# Patient Record
Sex: FEMALE | Race: WHITE
[De-identification: names, ages, dates, MRNs, and addresses within clinical notes are randomized per-mention and may not be internally consistent; named-entity substitution may affect disease eponyms.]

---

## 2017-07-05 ENCOUNTER — HOSPITAL ENCOUNTER (OUTPATIENT)
Dept: HOSPITAL 83 - US | Age: 42
Discharge: HOME | End: 2017-07-05
Attending: NURSE PRACTITIONER
Payer: COMMERCIAL

## 2017-07-05 DIAGNOSIS — M79.661: Primary | ICD-10-CM

## 2017-07-05 DIAGNOSIS — M79.662: ICD-10-CM

## 2017-11-13 ENCOUNTER — HOSPITAL ENCOUNTER (EMERGENCY)
Dept: HOSPITAL 83 - ED | Age: 42
Discharge: HOME | End: 2017-11-13
Payer: COMMERCIAL

## 2017-11-13 VITALS — DIASTOLIC BLOOD PRESSURE: 80 MMHG

## 2017-11-13 VITALS — HEIGHT: 63.98 IN | WEIGHT: 154 LBS | BODY MASS INDEX: 26.29 KG/M2

## 2017-11-13 DIAGNOSIS — Z90.89: ICD-10-CM

## 2017-11-13 DIAGNOSIS — Z79.899: ICD-10-CM

## 2017-11-13 DIAGNOSIS — N39.0: Primary | ICD-10-CM

## 2017-11-13 DIAGNOSIS — Z98.890: ICD-10-CM

## 2017-11-13 LAB
ALBUMIN SERPL-MCNC: 3.5 GM/DL (ref 3.1–4.5)
ALP SERPL-CCNC: 85 U/L (ref 45–117)
ALT SERPL W P-5'-P-CCNC: 21 U/L (ref 12–78)
APPEARANCE UR: (no result)
AST SERPL-CCNC: 21 IU/L (ref 3–35)
BACTERIA #/AREA URNS HPF: (no result) /[HPF]
BASOPHILS # BLD AUTO: 0 10*3/UL (ref 0–0.1)
BASOPHILS NFR BLD AUTO: 0.1 % (ref 0–1)
BILIRUB UR QL STRIP: NEGATIVE
BUN SERPL-MCNC: 15 MG/DL (ref 7–24)
CHLORIDE SERPL-SCNC: 106 MMOL/L (ref 98–107)
COLOR UR: YELLOW
CREAT SERPL-MCNC: 1.09 MG/DL (ref 0.55–1.02)
EOSINOPHIL # BLD AUTO: 0.2 10*3/UL (ref 0–0.4)
EOSINOPHIL # BLD AUTO: 2.7 % (ref 1–4)
EPI CELLS #/AREA URNS HPF: (no result) /[HPF]
ERYTHROCYTE [DISTWIDTH] IN BLOOD BY AUTOMATED COUNT: 13.2 % (ref 0–14.5)
GLUCOSE UR QL: NEGATIVE
HCT VFR BLD AUTO: 39.5 % (ref 37–47)
HGB BLD-MCNC: 13 G/DL (ref 12–16)
HGB UR QL STRIP: NEGATIVE
KETONES UR QL STRIP: NEGATIVE
LEUKOCYTE ESTERASE UR QL STRIP: NEGATIVE
LYMPHOCYTES # BLD AUTO: 2 10*3/UL (ref 1.3–4.4)
LYMPHOCYTES NFR BLD AUTO: 26.3 % (ref 27–41)
MCH RBC QN AUTO: 29.6 PG (ref 27–31)
MCHC RBC AUTO-ENTMCNC: 32.9 G/DL (ref 33–37)
MCV RBC AUTO: 90 FL (ref 81–99)
MONOCYTES # BLD AUTO: 0.6 10*3/UL (ref 0.1–1)
MONOCYTES NFR BLD MANUAL: 7.7 % (ref 3–9)
NEUT #: 4.7 10*3/UL (ref 2.3–7.9)
NEUT %: 62.8 % (ref 47–73)
NITRITE UR QL STRIP: NEGATIVE
NRBC BLD QL AUTO: 0 % (ref 0–0)
PH UR STRIP: 7 [PH] (ref 5–9)
PLATELET # BLD AUTO: 359 10*3/UL (ref 130–400)
PMV BLD AUTO: 10.7 FL (ref 9.6–12.3)
POTASSIUM SERPL-SCNC: 4.7 MMOL/L (ref 3.5–5.1)
PROT SERPL-MCNC: 7.1 GM/DL (ref 6.4–8.2)
RBC # BLD AUTO: 4.39 10*6/UL (ref 4.1–5.1)
RBC #/AREA URNS HPF: (no result) RBC/HPF (ref 0–2)
SODIUM SERPL-SCNC: 141 MMOL/L (ref 136–145)
SP GR UR: 1.01 (ref 1–1.03)
UROBILINOGEN UR STRIP-MCNC: 0.2 E.U./DL (ref 0.2–1)
WBC #/AREA URNS HPF: (no result) WBC/HPF (ref 0–5)
WBC NRBC COR # BLD AUTO: 7.5 10*3/UL (ref 4.8–10.8)

## 2018-10-03 ENCOUNTER — HOSPITAL ENCOUNTER (OUTPATIENT)
Dept: HOSPITAL 83 - MAMMO | Age: 43
Discharge: HOME | End: 2018-10-03
Attending: NURSE PRACTITIONER
Payer: COMMERCIAL

## 2018-10-03 DIAGNOSIS — R92.8: Primary | ICD-10-CM

## 2018-10-03 DIAGNOSIS — Z98.82: ICD-10-CM

## 2018-12-30 ENCOUNTER — HOSPITAL ENCOUNTER (EMERGENCY)
Age: 43
LOS: 1 days | Discharge: HOME | End: 2018-12-31
Payer: COMMERCIAL

## 2018-12-30 DIAGNOSIS — G43.909: ICD-10-CM

## 2018-12-30 DIAGNOSIS — Z79.899: ICD-10-CM

## 2018-12-30 DIAGNOSIS — K52.9: Primary | ICD-10-CM

## 2018-12-30 LAB
ALBUMIN SERPL-MCNC: 3.6 GM/DL (ref 3.1–4.5)
ALP SERPL-CCNC: 84 U/L (ref 45–117)
ALT SERPL W P-5'-P-CCNC: 19 U/L (ref 12–78)
AST SERPL-CCNC: 14 IU/L (ref 3–35)
BUN SERPL-MCNC: 16 MG/DL (ref 7–24)
CHLORIDE SERPL-SCNC: 105 MMOL/L (ref 98–107)
CREAT SERPL-MCNC: 0.85 MG/DL (ref 0.55–1.02)
ERYTHROCYTE [DISTWIDTH] IN BLOOD BY AUTOMATED COUNT: 13 % (ref 0–14.5)
HCT VFR BLD AUTO: 43.5 % (ref 37–47)
HGB BLD-MCNC: 14.3 G/DL (ref 12–16)
LIPASE SERPL-CCNC: 91 U/L (ref 73–393)
MCH RBC QN AUTO: 29.8 PG (ref 27–31)
MCHC RBC AUTO-ENTMCNC: 32.9 G/DL (ref 33–37)
MCV RBC AUTO: 90.6 FL (ref 81–99)
NRBC BLD QL AUTO: 0 % (ref 0–0)
PLATELET # BLD AUTO: 280 10*3/UL (ref 130–400)
PLATELET SUFFICIENCY: NORMAL
PMV BLD AUTO: 11.3 FL (ref 9.6–12.3)
POTASSIUM SERPL-SCNC: 3.9 MMOL/L (ref 3.5–5.1)
PROT SERPL-MCNC: 7.3 GM/DL (ref 6.4–8.2)
RBC # BLD AUTO: 4.8 10*6/UL (ref 4.1–5.1)
RBC MORPH BLD: NORMAL
SODIUM SERPL-SCNC: 137 MMOL/L (ref 136–145)
TOTAL CELLS COUNTED: 100 #CELLS
WBC NRBC COR # BLD AUTO: 12 10*3/UL (ref 4.8–10.8)

## 2018-12-31 LAB
APPEARANCE UR: CLEAR
BACTERIA #/AREA URNS HPF: (no result) /[HPF]
BILIRUB UR QL STRIP: NEGATIVE
COLOR UR: YELLOW
EPI CELLS #/AREA URNS HPF: (no result) /[HPF]
GLUCOSE UR QL: NEGATIVE
HGB UR QL STRIP: NEGATIVE
KETONES UR QL STRIP: (no result)
LEUKOCYTE ESTERASE UR QL STRIP: NEGATIVE
NITRITE UR QL STRIP: NEGATIVE
PH UR STRIP: 6 [PH] (ref 5–9)
RBC #/AREA URNS HPF: (no result) RBC/HPF (ref 0–2)
SP GR UR: >= 1.03 (ref 1–1.03)
UROBILINOGEN UR STRIP-MCNC: 0.2 E.U./DL (ref 0.2–1)

## 2019-11-05 ENCOUNTER — HOSPITAL ENCOUNTER (OUTPATIENT)
Dept: HOSPITAL 83 - CARD | Age: 44
Discharge: HOME | End: 2019-11-05
Attending: NURSE PRACTITIONER
Payer: COMMERCIAL

## 2019-11-05 DIAGNOSIS — R25.1: ICD-10-CM

## 2019-11-05 DIAGNOSIS — F41.9: Primary | ICD-10-CM

## 2019-11-05 DIAGNOSIS — R42: ICD-10-CM

## 2019-11-05 DIAGNOSIS — F32.9: ICD-10-CM

## 2019-11-05 DIAGNOSIS — L65.9: ICD-10-CM

## 2019-12-09 ENCOUNTER — HOSPITAL ENCOUNTER (OUTPATIENT)
Dept: HOSPITAL 83 - MRI | Age: 44
Discharge: HOME | End: 2019-12-09
Attending: NURSE PRACTITIONER
Payer: COMMERCIAL

## 2019-12-09 DIAGNOSIS — G43.909: Primary | ICD-10-CM

## 2019-12-09 DIAGNOSIS — H57.12: ICD-10-CM

## 2020-03-01 ENCOUNTER — HOSPITAL ENCOUNTER (EMERGENCY)
Dept: HOSPITAL 83 - ED | Age: 45
Discharge: HOME | End: 2020-03-01
Payer: COMMERCIAL

## 2020-03-01 VITALS — HEIGHT: 63.98 IN | WEIGHT: 150 LBS | BODY MASS INDEX: 25.61 KG/M2

## 2020-03-01 VITALS — SYSTOLIC BLOOD PRESSURE: 128 MMHG | DIASTOLIC BLOOD PRESSURE: 77 MMHG

## 2020-03-01 DIAGNOSIS — F32.9: ICD-10-CM

## 2020-03-01 DIAGNOSIS — Z79.899: ICD-10-CM

## 2020-03-01 DIAGNOSIS — F41.9: ICD-10-CM

## 2020-03-01 DIAGNOSIS — Z79.2: ICD-10-CM

## 2020-03-01 DIAGNOSIS — J11.1: Primary | ICD-10-CM

## 2020-05-04 ENCOUNTER — HOSPITAL ENCOUNTER (EMERGENCY)
Dept: HOSPITAL 83 - ED | Age: 45
Discharge: HOME | End: 2020-05-04
Payer: COMMERCIAL

## 2020-05-04 VITALS — BODY MASS INDEX: 26.63 KG/M2 | HEIGHT: 63.98 IN | WEIGHT: 156 LBS

## 2020-05-04 VITALS — DIASTOLIC BLOOD PRESSURE: 60 MMHG

## 2020-05-04 DIAGNOSIS — Z90.89: ICD-10-CM

## 2020-05-04 DIAGNOSIS — Z98.890: ICD-10-CM

## 2020-05-04 DIAGNOSIS — Z79.899: ICD-10-CM

## 2020-05-04 DIAGNOSIS — L25.9: Primary | ICD-10-CM

## 2020-05-16 ENCOUNTER — HOSPITAL ENCOUNTER (EMERGENCY)
Dept: HOSPITAL 83 - ED | Age: 45
LOS: 1 days | Discharge: HOME | End: 2020-05-17
Payer: COMMERCIAL

## 2020-05-16 VITALS — BODY MASS INDEX: 25.66 KG/M2 | WEIGHT: 154 LBS | HEIGHT: 65 IN

## 2020-05-16 DIAGNOSIS — R07.89: Primary | ICD-10-CM

## 2020-05-16 DIAGNOSIS — K21.9: ICD-10-CM

## 2020-05-16 DIAGNOSIS — F41.9: ICD-10-CM

## 2020-05-16 DIAGNOSIS — E78.5: ICD-10-CM

## 2020-05-16 DIAGNOSIS — Z79.899: ICD-10-CM

## 2020-05-16 LAB
ALBUMIN SERPL-MCNC: 3.4 GM/DL (ref 3.1–4.5)
ALP SERPL-CCNC: 65 U/L (ref 45–117)
ALT SERPL W P-5'-P-CCNC: 15 U/L (ref 12–78)
APTT PPP: 20.2 SECONDS (ref 20–32.1)
AST SERPL-CCNC: 13 IU/L (ref 3–35)
BASOPHILS # BLD AUTO: 0 10*3/UL (ref 0–0.1)
BASOPHILS NFR BLD AUTO: 0.1 % (ref 0–1)
BUN SERPL-MCNC: 14 MG/DL (ref 7–24)
CHLORIDE SERPL-SCNC: 105 MMOL/L (ref 98–107)
CREAT SERPL-MCNC: 1.04 MG/DL (ref 0.55–1.02)
EOSINOPHIL # BLD AUTO: 0 10*3/UL (ref 0–0.4)
EOSINOPHIL # BLD AUTO: 0.1 % (ref 1–4)
ERYTHROCYTE [DISTWIDTH] IN BLOOD BY AUTOMATED COUNT: 14.2 % (ref 0–14.5)
HCT VFR BLD AUTO: 39.2 % (ref 37–47)
INR BLD: 0.9 (ref 2–3.5)
LYMPHOCYTES # BLD AUTO: 3.2 10*3/UL (ref 1.3–4.4)
LYMPHOCYTES NFR BLD AUTO: 13.6 % (ref 27–41)
MCH RBC QN AUTO: 29.5 PG (ref 27–31)
MCHC RBC AUTO-ENTMCNC: 32.9 G/DL (ref 33–37)
MCV RBC AUTO: 89.7 FL (ref 81–99)
MONOCYTES # BLD AUTO: 1.2 10*3/UL (ref 0.1–1)
MONOCYTES NFR BLD MANUAL: 5.1 % (ref 3–9)
NEUT #: 18.6 10*3/UL (ref 2.3–7.9)
NEUT %: 80.3 % (ref 47–73)
NRBC BLD QL AUTO: 0 10*3/UL (ref 0–0)
PLATELET # BLD AUTO: 334 10*3/UL (ref 130–400)
PMV BLD AUTO: 10.6 FL (ref 9.6–12.3)
POTASSIUM SERPL-SCNC: 3.9 MMOL/L (ref 3.5–5.1)
PROT SERPL-MCNC: 6.7 GM/DL (ref 6.4–8.2)
RBC # BLD AUTO: 4.37 10*6/UL (ref 4.1–5.1)
SODIUM SERPL-SCNC: 139 MMOL/L (ref 136–145)
WBC NRBC COR # BLD AUTO: 23.2 10*3/UL (ref 4.8–10.8)

## 2020-05-17 VITALS — DIASTOLIC BLOOD PRESSURE: 78 MMHG | SYSTOLIC BLOOD PRESSURE: 112 MMHG

## 2020-05-17 LAB
APPEARANCE UR: CLEAR
BILIRUB UR QL STRIP: NEGATIVE
COLOR UR: YELLOW
GLUCOSE UR QL: NEGATIVE
HGB UR QL STRIP: NEGATIVE
KETONES UR QL STRIP: NEGATIVE
LEUKOCYTE ESTERASE UR QL STRIP: NEGATIVE
NITRITE UR QL STRIP: NEGATIVE
PH UR STRIP: 6.5 [PH] (ref 5–9)
RBC #/AREA URNS HPF: (no result) RBC/HPF (ref 0–2)
SP GR UR: 1.01 (ref 1–1.03)
TROPONIN I SERPL-MCNC: < 0.015 NG/ML (ref ?–0.04)
UROBILINOGEN UR STRIP-MCNC: 0.2 E.U./DL (ref 0.2–1)
WBC #/AREA URNS HPF: (no result) WBC/HPF (ref 0–5)

## 2020-05-28 ENCOUNTER — HOSPITAL ENCOUNTER (OUTPATIENT)
Dept: HOSPITAL 83 - US | Age: 45
Discharge: HOME | End: 2020-05-28
Attending: EMERGENCY MEDICINE
Payer: COMMERCIAL

## 2020-05-28 DIAGNOSIS — R10.9: Primary | ICD-10-CM

## 2020-07-31 ENCOUNTER — HOSPITAL ENCOUNTER (OUTPATIENT)
Dept: HOSPITAL 83 - US | Age: 45
Discharge: HOME | End: 2020-07-31
Attending: NURSE PRACTITIONER
Payer: COMMERCIAL

## 2020-07-31 DIAGNOSIS — R22.1: Primary | ICD-10-CM

## 2020-10-06 ENCOUNTER — HOSPITAL ENCOUNTER (EMERGENCY)
Dept: HOSPITAL 83 - ED | Age: 45
LOS: 1 days | Discharge: HOME | End: 2020-10-07
Payer: COMMERCIAL

## 2020-10-06 VITALS — BODY MASS INDEX: 26.66 KG/M2 | WEIGHT: 160 LBS | HEIGHT: 65 IN

## 2020-10-06 DIAGNOSIS — J06.9: Primary | ICD-10-CM

## 2020-10-06 DIAGNOSIS — Z79.899: ICD-10-CM

## 2021-02-03 ENCOUNTER — HOSPITAL ENCOUNTER (OUTPATIENT)
Dept: HOSPITAL 83 - MAMMO | Age: 46
Discharge: HOME | End: 2021-02-03
Attending: STUDENT IN AN ORGANIZED HEALTH CARE EDUCATION/TRAINING PROGRAM
Payer: COMMERCIAL

## 2021-02-03 DIAGNOSIS — R92.1: ICD-10-CM

## 2021-02-03 DIAGNOSIS — R92.8: ICD-10-CM

## 2021-02-03 DIAGNOSIS — M85.88: ICD-10-CM

## 2021-02-03 DIAGNOSIS — M48.02: ICD-10-CM

## 2021-02-03 DIAGNOSIS — M50.31: Primary | ICD-10-CM

## 2021-04-24 ENCOUNTER — HOSPITAL ENCOUNTER (EMERGENCY)
Dept: HOSPITAL 83 - ED | Age: 46
LOS: 1 days | Discharge: HOME | End: 2021-04-25
Payer: COMMERCIAL

## 2021-04-24 VITALS — BODY MASS INDEX: 28.34 KG/M2 | HEIGHT: 63.98 IN | WEIGHT: 166 LBS

## 2021-04-24 DIAGNOSIS — K21.9: ICD-10-CM

## 2021-04-24 DIAGNOSIS — F41.9: ICD-10-CM

## 2021-04-24 DIAGNOSIS — Z98.890: ICD-10-CM

## 2021-04-24 DIAGNOSIS — R07.89: Primary | ICD-10-CM

## 2021-04-24 DIAGNOSIS — Z79.899: ICD-10-CM

## 2021-04-24 DIAGNOSIS — E78.5: ICD-10-CM

## 2021-04-25 VITALS — DIASTOLIC BLOOD PRESSURE: 96 MMHG | SYSTOLIC BLOOD PRESSURE: 140 MMHG

## 2021-04-25 LAB
ALBUMIN SERPL-MCNC: 3.3 GM/DL (ref 3.1–4.5)
ALP SERPL-CCNC: 116 U/L (ref 45–117)
ALT SERPL W P-5'-P-CCNC: 14 U/L (ref 12–78)
AST SERPL-CCNC: 12 IU/L (ref 3–35)
BASOPHILS # BLD AUTO: 0 10*3/UL (ref 0–0.1)
BASOPHILS NFR BLD AUTO: 0.3 % (ref 0–1)
BUN SERPL-MCNC: 16 MG/DL (ref 7–24)
CHLORIDE SERPL-SCNC: 108 MMOL/L (ref 98–107)
CREAT SERPL-MCNC: 0.97 MG/DL (ref 0.55–1.02)
EOSINOPHIL # BLD AUTO: 0.1 10*3/UL (ref 0–0.4)
EOSINOPHIL # BLD AUTO: 0.5 % (ref 1–4)
ERYTHROCYTE [DISTWIDTH] IN BLOOD BY AUTOMATED COUNT: 13.5 % (ref 0–14.5)
HCT VFR BLD AUTO: 38.2 % (ref 37–47)
LYMPHOCYTES # BLD AUTO: 1.8 10*3/UL (ref 1.3–4.4)
LYMPHOCYTES NFR BLD AUTO: 15.6 % (ref 27–41)
MCH RBC QN AUTO: 28.4 PG (ref 27–31)
MCHC RBC AUTO-ENTMCNC: 32.2 G/DL (ref 33–37)
MCV RBC AUTO: 88.2 FL (ref 81–99)
MONOCYTES # BLD AUTO: 0.9 10*3/UL (ref 0.1–1)
MONOCYTES NFR BLD MANUAL: 7.5 % (ref 3–9)
NEUT #: 8.8 10*3/UL (ref 2.3–7.9)
NEUT %: 75.8 % (ref 47–73)
NRBC BLD QL AUTO: 0 10*3/UL (ref 0–0)
PLATELET # BLD AUTO: 327 10*3/UL (ref 130–400)
PMV BLD AUTO: 11.4 FL (ref 9.6–12.3)
POTASSIUM SERPL-SCNC: 3.3 MMOL/L (ref 3.5–5.1)
PROT SERPL-MCNC: 6.8 GM/DL (ref 6.4–8.2)
RBC # BLD AUTO: 4.33 10*6/UL (ref 4.1–5.1)
SODIUM SERPL-SCNC: 141 MMOL/L (ref 136–145)
WBC NRBC COR # BLD AUTO: 11.6 10*3/UL (ref 4.8–10.8)

## 2021-07-17 ENCOUNTER — HOSPITAL ENCOUNTER (EMERGENCY)
Dept: HOSPITAL 83 - ED | Age: 46
Discharge: HOME | End: 2021-07-17
Payer: COMMERCIAL

## 2021-07-17 VITALS — DIASTOLIC BLOOD PRESSURE: 73 MMHG | SYSTOLIC BLOOD PRESSURE: 110 MMHG

## 2021-07-17 VITALS — BODY MASS INDEX: 29.02 KG/M2 | HEIGHT: 63.98 IN | WEIGHT: 170 LBS

## 2021-07-17 DIAGNOSIS — Z87.442: ICD-10-CM

## 2021-07-17 DIAGNOSIS — M94.0: ICD-10-CM

## 2021-07-17 DIAGNOSIS — Z79.899: ICD-10-CM

## 2021-07-17 DIAGNOSIS — M65.842: ICD-10-CM

## 2021-07-17 DIAGNOSIS — Z98.890: ICD-10-CM

## 2021-07-17 DIAGNOSIS — Z90.89: ICD-10-CM

## 2021-07-17 DIAGNOSIS — R30.9: ICD-10-CM

## 2021-07-17 DIAGNOSIS — M65.841: Primary | ICD-10-CM

## 2021-07-17 DIAGNOSIS — M54.5: ICD-10-CM

## 2021-07-17 LAB
BACTERIA #/AREA URNS HPF: (no result) /[HPF]
EPI CELLS #/AREA URNS HPF: (no result) /[HPF]
PH UR STRIP: 5.5 [PH] (ref 4.5–8)
RBC #/AREA URNS HPF: (no result) RBC/HPF (ref 0–2)
SP GR UR: >= 1.03 (ref 1–1.03)
UROBILINOGEN UR STRIP-MCNC: 1 E.U./DL (ref 0–1)
WBC #/AREA URNS HPF: (no result) WBC/HPF (ref 0–5)

## 2021-08-04 ENCOUNTER — HOSPITAL ENCOUNTER (EMERGENCY)
Dept: HOSPITAL 83 - ED | Age: 46
Discharge: HOME | End: 2021-08-04
Payer: COMMERCIAL

## 2021-08-04 VITALS — DIASTOLIC BLOOD PRESSURE: 81 MMHG | SYSTOLIC BLOOD PRESSURE: 129 MMHG

## 2021-08-04 VITALS — WEIGHT: 170 LBS | BODY MASS INDEX: 29.02 KG/M2 | HEIGHT: 63.98 IN

## 2021-08-04 DIAGNOSIS — Z90.89: ICD-10-CM

## 2021-08-04 DIAGNOSIS — Z98.890: ICD-10-CM

## 2021-08-04 DIAGNOSIS — R10.30: Primary | ICD-10-CM

## 2021-08-04 DIAGNOSIS — Z79.899: ICD-10-CM

## 2021-08-04 LAB
ALBUMIN SERPL-MCNC: 3.6 GM/DL (ref 3.1–4.5)
ALP SERPL-CCNC: 102 U/L (ref 45–117)
ALT SERPL W P-5'-P-CCNC: 15 U/L (ref 12–78)
AST SERPL-CCNC: 13 IU/L (ref 3–35)
BACTERIA #/AREA URNS HPF: (no result) /[HPF]
BASOPHILS # BLD AUTO: 0 10*3/UL (ref 0–0.1)
BASOPHILS NFR BLD AUTO: 0.3 % (ref 0–1)
BUN SERPL-MCNC: 11 MG/DL (ref 7–24)
CHLORIDE SERPL-SCNC: 105 MMOL/L (ref 98–107)
CREAT SERPL-MCNC: 0.83 MG/DL (ref 0.55–1.02)
EOSINOPHIL # BLD AUTO: 0.1 10*3/UL (ref 0–0.4)
EOSINOPHIL # BLD AUTO: 1.3 % (ref 1–4)
ERYTHROCYTE [DISTWIDTH] IN BLOOD BY AUTOMATED COUNT: 13.9 % (ref 0–14.5)
HCT VFR BLD AUTO: 41.2 % (ref 37–47)
LIPASE SERPL-CCNC: 107 U/L (ref 73–393)
LYMPHOCYTES # BLD AUTO: 2.2 10*3/UL (ref 1.3–4.4)
LYMPHOCYTES NFR BLD AUTO: 23.6 % (ref 27–41)
MCH RBC QN AUTO: 28.1 PG (ref 27–31)
MCHC RBC AUTO-ENTMCNC: 31.6 G/DL (ref 33–37)
MCV RBC AUTO: 89.2 FL (ref 81–99)
MONOCYTES # BLD AUTO: 0.7 10*3/UL (ref 0.1–1)
MONOCYTES NFR BLD MANUAL: 7.6 % (ref 3–9)
NEUT #: 6.1 10*3/UL (ref 2.3–7.9)
NEUT %: 67 % (ref 47–73)
NRBC BLD QL AUTO: 0 10*3/UL (ref 0–0)
PH UR STRIP: 6.5 [PH] (ref 4.5–8)
PLATELET # BLD AUTO: 336 10*3/UL (ref 130–400)
PMV BLD AUTO: 11.1 FL (ref 9.6–12.3)
POTASSIUM SERPL-SCNC: 4.2 MMOL/L (ref 3.5–5.1)
PROT SERPL-MCNC: 7.3 GM/DL (ref 6.4–8.2)
RBC # BLD AUTO: 4.62 10*6/UL (ref 4.1–5.1)
RBC #/AREA URNS HPF: (no result) RBC/HPF (ref 0–2)
SODIUM SERPL-SCNC: 139 MMOL/L (ref 136–145)
SP GR UR: 1.02 (ref 1–1.03)
UROBILINOGEN UR STRIP-MCNC: 1 E.U./DL (ref 0–1)
WBC #/AREA URNS HPF: (no result) WBC/HPF (ref 0–5)
WBC NRBC COR # BLD AUTO: 9.2 10*3/UL (ref 4.8–10.8)

## 2021-12-25 ENCOUNTER — HOSPITAL ENCOUNTER (EMERGENCY)
Dept: HOSPITAL 83 - ED | Age: 46
Discharge: HOME | End: 2021-12-25
Payer: COMMERCIAL

## 2021-12-25 VITALS — WEIGHT: 160 LBS | BODY MASS INDEX: 25.71 KG/M2 | HEIGHT: 65.98 IN

## 2021-12-25 VITALS — DIASTOLIC BLOOD PRESSURE: 63 MMHG | SYSTOLIC BLOOD PRESSURE: 122 MMHG

## 2021-12-25 DIAGNOSIS — Z20.822: ICD-10-CM

## 2021-12-25 DIAGNOSIS — U07.1: Primary | ICD-10-CM

## 2022-01-23 ENCOUNTER — HOSPITAL ENCOUNTER (EMERGENCY)
Dept: HOSPITAL 83 - ED | Age: 47
Discharge: HOME | End: 2022-01-23
Payer: COMMERCIAL

## 2022-01-23 VITALS — DIASTOLIC BLOOD PRESSURE: 79 MMHG | SYSTOLIC BLOOD PRESSURE: 139 MMHG

## 2022-01-23 VITALS — BODY MASS INDEX: 28 KG/M2 | HEIGHT: 63.98 IN | WEIGHT: 164 LBS

## 2022-01-23 DIAGNOSIS — J06.9: Primary | ICD-10-CM

## 2022-01-23 DIAGNOSIS — Z98.890: ICD-10-CM

## 2022-01-23 DIAGNOSIS — K21.9: ICD-10-CM

## 2022-01-23 DIAGNOSIS — Z90.89: ICD-10-CM

## 2022-01-23 DIAGNOSIS — Z20.822: ICD-10-CM

## 2022-01-23 DIAGNOSIS — Z79.899: ICD-10-CM

## 2022-05-04 ENCOUNTER — HOSPITAL ENCOUNTER (OUTPATIENT)
Dept: HOSPITAL 83 - MAMMO | Age: 47
Discharge: HOME | End: 2022-05-04
Attending: NURSE PRACTITIONER
Payer: COMMERCIAL

## 2022-05-04 DIAGNOSIS — Z12.31: Primary | ICD-10-CM

## 2022-07-25 ENCOUNTER — HOSPITAL ENCOUNTER (EMERGENCY)
Dept: HOSPITAL 83 - ED | Age: 47
Discharge: HOME | End: 2022-07-25
Payer: COMMERCIAL

## 2022-07-25 VITALS — BODY MASS INDEX: 29.02 KG/M2 | HEIGHT: 63.98 IN | WEIGHT: 170 LBS

## 2022-07-25 VITALS — DIASTOLIC BLOOD PRESSURE: 70 MMHG | SYSTOLIC BLOOD PRESSURE: 109 MMHG

## 2022-07-25 DIAGNOSIS — Z90.89: ICD-10-CM

## 2022-07-25 DIAGNOSIS — L25.9: Primary | ICD-10-CM

## 2022-07-25 DIAGNOSIS — R30.0: ICD-10-CM

## 2022-07-25 DIAGNOSIS — Z79.899: ICD-10-CM

## 2022-07-25 DIAGNOSIS — Z98.890: ICD-10-CM

## 2022-07-25 LAB
BACTERIA #/AREA URNS HPF: (no result) /[HPF]
EPI CELLS #/AREA URNS HPF: (no result) /[HPF]
PH UR STRIP: 6.5 [PH] (ref 4.5–8)
SP GR UR: >= 1.03 (ref 1–1.03)
UROBILINOGEN UR STRIP-MCNC: 1 E.U./DL (ref 0–1)
WBC #/AREA URNS HPF: (no result) WBC/HPF (ref 0–5)

## 2022-09-06 ENCOUNTER — HOSPITAL ENCOUNTER (EMERGENCY)
Dept: HOSPITAL 83 - ED | Age: 47
Discharge: HOME | End: 2022-09-06
Payer: COMMERCIAL

## 2022-09-06 VITALS — DIASTOLIC BLOOD PRESSURE: 90 MMHG | SYSTOLIC BLOOD PRESSURE: 142 MMHG

## 2022-09-06 VITALS — HEIGHT: 65 IN | WEIGHT: 160 LBS | BODY MASS INDEX: 26.66 KG/M2

## 2022-09-06 DIAGNOSIS — R07.89: Primary | ICD-10-CM

## 2022-09-06 DIAGNOSIS — Z79.899: ICD-10-CM

## 2022-09-06 DIAGNOSIS — Z98.890: ICD-10-CM

## 2022-09-06 DIAGNOSIS — M54.2: ICD-10-CM

## 2022-09-06 DIAGNOSIS — Z90.49: ICD-10-CM

## 2022-09-06 DIAGNOSIS — M25.512: ICD-10-CM

## 2022-09-06 LAB
ALP SERPL-CCNC: 97 U/L (ref 45–117)
ALT SERPL W P-5'-P-CCNC: 14 U/L (ref 12–78)
APTT PPP: 24.1 SECONDS (ref 20–32.1)
AST SERPL-CCNC: 11 IU/L (ref 3–35)
BASOPHILS # BLD AUTO: 0 10*3/UL (ref 0–0.1)
BASOPHILS NFR BLD AUTO: 0.3 % (ref 0–1)
BUN SERPL-MCNC: 11 MG/DL (ref 7–24)
CHLORIDE SERPL-SCNC: 111 MMOL/L (ref 98–107)
CREAT SERPL-MCNC: 0.85 MG/DL (ref 0.55–1.02)
EOSINOPHIL # BLD AUTO: 0.2 10*3/UL (ref 0–0.4)
EOSINOPHIL # BLD AUTO: 1.7 % (ref 1–4)
ERYTHROCYTE [DISTWIDTH] IN BLOOD BY AUTOMATED COUNT: 14.2 % (ref 0–14.5)
HCT VFR BLD AUTO: 38 % (ref 37–47)
INR BLD: 0.9 (ref 2–3.5)
LYMPHOCYTES # BLD AUTO: 2.9 10*3/UL (ref 1.3–4.4)
LYMPHOCYTES NFR BLD AUTO: 27.5 % (ref 27–41)
MCH RBC QN AUTO: 29.5 PG (ref 27–31)
MCHC RBC AUTO-ENTMCNC: 32.6 G/DL (ref 33–37)
MCV RBC AUTO: 90.3 FL (ref 81–99)
MONOCYTES # BLD AUTO: 0.8 10*3/UL (ref 0.1–1)
MONOCYTES NFR BLD MANUAL: 7.2 % (ref 3–9)
NEUT #: 6.5 10*3/UL (ref 2.3–7.9)
NEUT %: 61.9 % (ref 47–73)
NRBC BLD QL AUTO: 0 % (ref 0–0)
PLATELET # BLD AUTO: 318 10*3/UL (ref 130–400)
PMV BLD AUTO: 12.3 FL (ref 9.6–12.3)
POTASSIUM SERPL-SCNC: 3.5 MMOL/L (ref 3.5–5.1)
PROT SERPL-MCNC: 6.5 GM/DL (ref 6.4–8.2)
RBC # BLD AUTO: 4.21 10*6/UL (ref 4.1–5.1)
SODIUM SERPL-SCNC: 140 MMOL/L (ref 136–145)
WBC NRBC COR # BLD AUTO: 10.5 10*3/UL (ref 4.8–10.8)

## 2022-12-20 ENCOUNTER — HOSPITAL ENCOUNTER (EMERGENCY)
Dept: HOSPITAL 83 - ED | Age: 47
Discharge: HOME | End: 2022-12-20
Payer: COMMERCIAL

## 2022-12-20 VITALS — DIASTOLIC BLOOD PRESSURE: 82 MMHG | SYSTOLIC BLOOD PRESSURE: 123 MMHG

## 2022-12-20 VITALS — HEIGHT: 65 IN | WEIGHT: 165 LBS | BODY MASS INDEX: 27.49 KG/M2

## 2022-12-20 DIAGNOSIS — Z90.89: ICD-10-CM

## 2022-12-20 DIAGNOSIS — U07.1: Primary | ICD-10-CM

## 2022-12-20 DIAGNOSIS — Z98.890: ICD-10-CM

## 2022-12-20 DIAGNOSIS — F10.20: ICD-10-CM

## 2022-12-20 DIAGNOSIS — J06.9: ICD-10-CM

## 2023-01-18 ENCOUNTER — HOSPITAL ENCOUNTER (EMERGENCY)
Dept: HOSPITAL 83 - ED | Age: 48
LOS: 1 days | Discharge: HOME | End: 2023-01-19
Payer: COMMERCIAL

## 2023-01-18 VITALS — WEIGHT: 165 LBS | HEIGHT: 63.98 IN | BODY MASS INDEX: 28.17 KG/M2

## 2023-01-18 VITALS — SYSTOLIC BLOOD PRESSURE: 123 MMHG | DIASTOLIC BLOOD PRESSURE: 72 MMHG

## 2023-01-18 DIAGNOSIS — Z98.890: ICD-10-CM

## 2023-01-18 DIAGNOSIS — R30.0: ICD-10-CM

## 2023-01-18 DIAGNOSIS — J03.90: Primary | ICD-10-CM

## 2023-01-18 DIAGNOSIS — Z20.822: ICD-10-CM

## 2023-01-18 DIAGNOSIS — I10: ICD-10-CM

## 2023-01-18 DIAGNOSIS — N39.0: ICD-10-CM

## 2023-01-18 DIAGNOSIS — Z90.89: ICD-10-CM

## 2023-01-18 DIAGNOSIS — F10.20: ICD-10-CM

## 2023-01-18 DIAGNOSIS — F41.9: ICD-10-CM

## 2023-01-18 DIAGNOSIS — K21.9: ICD-10-CM

## 2023-01-18 LAB
PH UR STRIP: 6.5 [PH] (ref 4.5–8)
RBC #/AREA URNS HPF: (no result) RBC/HPF (ref 0–2)
SP GR UR: 1.02 (ref 1–1.03)
UROBILINOGEN UR STRIP-MCNC: 1 E.U./DL (ref 0–1)
WBC #/AREA URNS HPF: (no result) WBC/HPF (ref 0–5)

## 2023-02-22 ENCOUNTER — HOSPITAL ENCOUNTER (EMERGENCY)
Dept: HOSPITAL 83 - ED | Age: 48
Discharge: HOME | End: 2023-02-22
Payer: COMMERCIAL

## 2023-02-22 VITALS — HEIGHT: 55 IN | WEIGHT: 130 LBS

## 2023-02-22 VITALS — DIASTOLIC BLOOD PRESSURE: 75 MMHG
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
## 2023-02-22 DIAGNOSIS — S23.3XXA: Primary | ICD-10-CM

## 2023-02-22 DIAGNOSIS — Z79.899: ICD-10-CM

## 2023-02-22 DIAGNOSIS — Z90.89: ICD-10-CM
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
## 2023-02-22 DIAGNOSIS — Z98.890: ICD-10-CM
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
## 2023-03-29 ENCOUNTER — HOSPITAL ENCOUNTER (EMERGENCY)
Dept: HOSPITAL 83 - ED | Age: 48
Discharge: HOME | End: 2023-03-29
Payer: COMMERCIAL

## 2023-03-29 VITALS — SYSTOLIC BLOOD PRESSURE: 171 MMHG | DIASTOLIC BLOOD PRESSURE: 79 MMHG

## 2023-03-29 VITALS — HEIGHT: 63.98 IN | BODY MASS INDEX: 29.88 KG/M2 | WEIGHT: 175 LBS
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
## 2023-03-29 DIAGNOSIS — Z79.899: ICD-10-CM
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
## 2023-03-29 DIAGNOSIS — Z98.890: ICD-10-CM

## 2023-03-29 DIAGNOSIS — Z90.89: ICD-10-CM

## 2023-03-29 DIAGNOSIS — S06.0X0A: Primary | ICD-10-CM

## 2023-03-30 ENCOUNTER — HOSPITAL ENCOUNTER (EMERGENCY)
Dept: HOSPITAL 83 - ED | Age: 48
LOS: 1 days | Discharge: HOME | End: 2023-03-31
Payer: COMMERCIAL

## 2023-03-30 VITALS — HEIGHT: 63.98 IN | WEIGHT: 175 LBS | BODY MASS INDEX: 29.88 KG/M2

## 2023-03-30 VITALS — SYSTOLIC BLOOD PRESSURE: 120 MMHG | DIASTOLIC BLOOD PRESSURE: 67 MMHG

## 2023-03-30 DIAGNOSIS — R07.81: Primary | ICD-10-CM
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
## 2023-05-16 ENCOUNTER — HOSPITAL ENCOUNTER (OUTPATIENT)
Dept: DATA CONVERSION | Facility: HOSPITAL | Age: 48
End: 2023-05-16
Attending: STUDENT IN AN ORGANIZED HEALTH CARE EDUCATION/TRAINING PROGRAM

## 2023-05-16 DIAGNOSIS — K64.4 RESIDUAL HEMORRHOIDAL SKIN TAGS: ICD-10-CM

## 2023-05-16 DIAGNOSIS — F41.9 ANXIETY DISORDER, UNSPECIFIED: ICD-10-CM

## 2023-05-16 DIAGNOSIS — K21.9 GASTRO-ESOPHAGEAL REFLUX DISEASE WITHOUT ESOPHAGITIS: ICD-10-CM

## 2023-05-16 DIAGNOSIS — Z12.11 ENCOUNTER FOR SCREENING FOR MALIGNANT NEOPLASM OF COLON: ICD-10-CM

## 2023-05-16 DIAGNOSIS — Z12.12 ENCOUNTER FOR SCREENING FOR MALIGNANT NEOPLASM OF RECTUM: ICD-10-CM

## 2023-06-02 ENCOUNTER — HOSPITAL ENCOUNTER (EMERGENCY)
Dept: HOSPITAL 83 - ED | Age: 48
Discharge: HOME | End: 2023-06-02
Payer: COMMERCIAL

## 2023-06-02 VITALS — DIASTOLIC BLOOD PRESSURE: 88 MMHG | BODY MASS INDEX: 29.02 KG/M2 | HEIGHT: 63.98 IN | WEIGHT: 170 LBS

## 2023-06-02 DIAGNOSIS — Z79.899: ICD-10-CM

## 2023-06-02 DIAGNOSIS — R30.0: ICD-10-CM

## 2023-06-02 DIAGNOSIS — M25.562: Primary | ICD-10-CM

## 2023-06-02 DIAGNOSIS — Z90.89: ICD-10-CM

## 2023-06-02 DIAGNOSIS — Z98.890: ICD-10-CM

## 2023-06-02 LAB
BACTERIA #/AREA URNS HPF: (no result) /[HPF]
EPI CELLS #/AREA URNS HPF: (no result) /[HPF]
HGB UR QL STRIP: (no result)
LEUKOCYTE ESTERASE UR QL STRIP: (no result)
MUCOUS THREADS URNS QL MICRO: (no result)
PH UR STRIP: 5.5 [PH] (ref 4.5–8)
RBC #/AREA URNS HPF: (no result) RBC/HPF (ref 0–2)
SP GR UR: >= 1.03 (ref 1–1.03)
UROBILINOGEN UR STRIP-MCNC: 1 E.U./DL (ref 0–1)
WBC #/AREA URNS HPF: (no result) WBC/HPF (ref 0–5)

## 2023-06-11 ENCOUNTER — HOSPITAL ENCOUNTER (EMERGENCY)
Dept: HOSPITAL 83 - ED | Age: 48
Discharge: HOME | End: 2023-06-11
Payer: COMMERCIAL

## 2023-06-11 VITALS — BODY MASS INDEX: 30.05 KG/M2 | HEIGHT: 63.98 IN | WEIGHT: 176 LBS

## 2023-06-11 VITALS — DIASTOLIC BLOOD PRESSURE: 78 MMHG

## 2023-06-11 DIAGNOSIS — L23.7: Primary | ICD-10-CM

## 2023-06-11 DIAGNOSIS — F32.A: ICD-10-CM

## 2023-06-11 DIAGNOSIS — Z98.890: ICD-10-CM

## 2023-06-11 DIAGNOSIS — Z90.89: ICD-10-CM

## 2023-06-11 DIAGNOSIS — F41.9: ICD-10-CM

## 2023-08-31 ENCOUNTER — HOSPITAL ENCOUNTER (EMERGENCY)
Dept: HOSPITAL 83 - ED | Age: 48
Discharge: LEFT BEFORE DISCHARGE | End: 2023-08-31
Payer: COMMERCIAL

## 2023-08-31 DIAGNOSIS — M79.659: Primary | ICD-10-CM

## 2023-08-31 DIAGNOSIS — Z53.21: ICD-10-CM

## 2023-11-01 ENCOUNTER — HOSPITAL ENCOUNTER (EMERGENCY)
Dept: HOSPITAL 83 - ED | Age: 48
Discharge: HOME | End: 2023-11-01
Payer: COMMERCIAL

## 2023-11-01 VITALS — HEIGHT: 55 IN | WEIGHT: 173 LBS

## 2023-11-01 VITALS — SYSTOLIC BLOOD PRESSURE: 126 MMHG | DIASTOLIC BLOOD PRESSURE: 80 MMHG

## 2023-11-01 DIAGNOSIS — E83.41: ICD-10-CM

## 2023-11-01 DIAGNOSIS — F41.9: ICD-10-CM

## 2023-11-01 DIAGNOSIS — Z98.890: ICD-10-CM

## 2023-11-01 DIAGNOSIS — F32.A: ICD-10-CM

## 2023-11-01 DIAGNOSIS — K21.9: ICD-10-CM

## 2023-11-01 DIAGNOSIS — Z90.89: ICD-10-CM

## 2023-11-01 DIAGNOSIS — J32.9: Primary | ICD-10-CM

## 2023-11-01 DIAGNOSIS — Z86.16: ICD-10-CM

## 2023-12-06 ENCOUNTER — HOSPITAL ENCOUNTER (OUTPATIENT)
Dept: HOSPITAL 83 - MAMMO | Age: 48
Discharge: HOME | End: 2023-12-06
Attending: STUDENT IN AN ORGANIZED HEALTH CARE EDUCATION/TRAINING PROGRAM
Payer: COMMERCIAL

## 2023-12-06 DIAGNOSIS — Z12.31: Primary | ICD-10-CM

## 2023-12-18 ENCOUNTER — HOSPITAL ENCOUNTER (EMERGENCY)
Dept: HOSPITAL 83 - ED | Age: 48
Discharge: HOME | End: 2023-12-18
Payer: COMMERCIAL

## 2023-12-18 VITALS — WEIGHT: 173 LBS | BODY MASS INDEX: 29.53 KG/M2 | HEIGHT: 63.98 IN

## 2023-12-18 VITALS — DIASTOLIC BLOOD PRESSURE: 79 MMHG

## 2023-12-18 DIAGNOSIS — Z98.890: ICD-10-CM

## 2023-12-18 DIAGNOSIS — F32.A: ICD-10-CM

## 2023-12-18 DIAGNOSIS — F41.9: ICD-10-CM

## 2023-12-18 DIAGNOSIS — R12: ICD-10-CM

## 2023-12-18 DIAGNOSIS — B34.9: Primary | ICD-10-CM

## 2023-12-18 DIAGNOSIS — Z20.822: ICD-10-CM

## 2023-12-18 DIAGNOSIS — Z90.89: ICD-10-CM

## 2024-01-02 ENCOUNTER — HOSPITAL ENCOUNTER (EMERGENCY)
Dept: HOSPITAL 83 - ED | Age: 49
LOS: 1 days | Discharge: HOME | End: 2024-01-03
Payer: COMMERCIAL

## 2024-01-02 VITALS — DIASTOLIC BLOOD PRESSURE: 80 MMHG

## 2024-01-02 VITALS — HEIGHT: 63.98 IN | WEIGHT: 170 LBS | BODY MASS INDEX: 29.02 KG/M2

## 2024-01-02 DIAGNOSIS — Z90.89: ICD-10-CM

## 2024-01-02 DIAGNOSIS — Z79.2: ICD-10-CM

## 2024-01-02 DIAGNOSIS — J02.9: ICD-10-CM

## 2024-01-02 DIAGNOSIS — Z98.890: ICD-10-CM

## 2024-01-02 DIAGNOSIS — Z79.899: ICD-10-CM

## 2024-01-02 DIAGNOSIS — R14.0: Primary | ICD-10-CM

## 2024-01-03 LAB
ALP SERPL-CCNC: 99 U/L (ref 46–116)
ALT SERPL W P-5'-P-CCNC: 10 U/L (ref 5–49)
BASOPHILS # BLD AUTO: 0 10*3/UL (ref 0–0.1)
BASOPHILS NFR BLD AUTO: 0.3 % (ref 0–1)
BUN SERPL-MCNC: 13 MG/DL (ref 9–23)
CHLORIDE SERPL-SCNC: 105 MMOL/L (ref 98–107)
EOSINOPHIL # BLD AUTO: 0.2 10*3/UL (ref 0–0.4)
EOSINOPHIL # BLD AUTO: 1.9 % (ref 1–4)
ERYTHROCYTE [DISTWIDTH] IN BLOOD BY AUTOMATED COUNT: 13.6 % (ref 0–14.5)
HCT VFR BLD AUTO: 42.6 % (ref 37–47)
LYMPHOCYTES # BLD AUTO: 2.6 10*3/UL (ref 1.3–4.4)
LYMPHOCYTES NFR BLD AUTO: 27.4 % (ref 27–41)
MCH RBC QN AUTO: 28.1 PG (ref 27–31)
MCHC RBC AUTO-ENTMCNC: 31 G/DL (ref 33–37)
MCV RBC AUTO: 90.8 FL (ref 81–99)
MONOCYTES # BLD AUTO: 0.7 10*3/UL (ref 0.1–1)
MONOCYTES NFR BLD MANUAL: 7.3 % (ref 3–9)
NEUT #: 5.9 10*3/UL (ref 2.3–7.9)
NEUT %: 62.7 % (ref 47–73)
NRBC BLD QL AUTO: 0 10*3/UL (ref 0–0)
PLATELET # BLD AUTO: 399 10*3/UL (ref 130–400)
PMV BLD AUTO: 10.9 FL (ref 9.6–12.3)
POTASSIUM SERPL-SCNC: 3.9 MMOL/L (ref 3.4–5.1)
PROT SERPL-MCNC: 7.1 GM/DL (ref 6–8)
RBC # BLD AUTO: 4.69 10*6/UL (ref 4.1–5.1)
WBC NRBC COR # BLD AUTO: 9.4 10*3/UL (ref 4.8–10.8)

## 2024-07-27 ENCOUNTER — HOSPITAL ENCOUNTER (EMERGENCY)
Dept: HOSPITAL 83 - ED | Age: 49
Discharge: HOME | End: 2024-07-27
Payer: COMMERCIAL

## 2024-07-27 VITALS — BODY MASS INDEX: 30.73 KG/M2 | WEIGHT: 180 LBS | HEIGHT: 63.98 IN

## 2024-07-27 VITALS — DIASTOLIC BLOOD PRESSURE: 47 MMHG

## 2024-07-27 DIAGNOSIS — Z90.89: ICD-10-CM

## 2024-07-27 DIAGNOSIS — Z79.2: ICD-10-CM

## 2024-07-27 DIAGNOSIS — R03.0: ICD-10-CM

## 2024-07-27 DIAGNOSIS — M54.42: ICD-10-CM

## 2024-07-27 DIAGNOSIS — Z79.899: ICD-10-CM

## 2024-07-27 DIAGNOSIS — Z98.890: ICD-10-CM

## 2024-07-27 DIAGNOSIS — K21.9: ICD-10-CM

## 2024-07-27 DIAGNOSIS — M54.41: Primary | ICD-10-CM

## 2024-07-27 DIAGNOSIS — F41.9: ICD-10-CM

## 2024-07-27 LAB
BASOPHILS # BLD AUTO: 0 10*3/UL (ref 0–0.1)
BASOPHILS NFR BLD AUTO: 0.3 % (ref 0–1)
BUN SERPL-MCNC: 13 MG/DL (ref 9–23)
CHLORIDE SERPL-SCNC: 109 MMOL/L (ref 98–107)
EOSINOPHIL # BLD AUTO: 0.2 10*3/UL (ref 0–0.4)
EOSINOPHIL # BLD AUTO: 2.3 % (ref 1–4)
ERYTHROCYTE [DISTWIDTH] IN BLOOD BY AUTOMATED COUNT: 14 % (ref 0–14.5)
HCT VFR BLD AUTO: 38.4 % (ref 37–47)
LYMPHOCYTES # BLD AUTO: 2.3 10*3/UL (ref 1.3–4.4)
LYMPHOCYTES NFR BLD AUTO: 26.1 % (ref 27–41)
MCH RBC QN AUTO: 29.2 PG (ref 27–31)
MCHC RBC AUTO-ENTMCNC: 32 G/DL (ref 33–37)
MCV RBC AUTO: 91.2 FL (ref 81–99)
MONOCYTES # BLD AUTO: 0.6 10*3/UL (ref 0.1–1)
MONOCYTES NFR BLD MANUAL: 7.3 % (ref 3–9)
NEUT #: 5.6 10*3/UL (ref 2.3–7.9)
NEUT %: 63.8 % (ref 47–73)
NRBC BLD QL AUTO: 0 % (ref 0–0)
PLATELET # BLD AUTO: 349 10*3/UL (ref 130–400)
PMV BLD AUTO: 11 FL (ref 9.6–12.3)
POTASSIUM SERPL-SCNC: 4 MMOL/L (ref 3.4–5.1)
RBC # BLD AUTO: 4.21 10*6/UL (ref 4.1–5.1)
WBC NRBC COR # BLD AUTO: 8.8 10*3/UL (ref 4.8–10.8)

## 2024-08-08 ENCOUNTER — HOSPITAL ENCOUNTER (OUTPATIENT)
Dept: HOSPITAL 83 - RAD | Age: 49
Discharge: HOME | End: 2024-08-08
Payer: COMMERCIAL

## 2024-08-08 DIAGNOSIS — M47.816: Primary | ICD-10-CM

## 2024-08-10 ENCOUNTER — HOSPITAL ENCOUNTER (OUTPATIENT)
Dept: HOSPITAL 83 - US | Age: 49
Discharge: HOME | End: 2024-08-10
Attending: LICENSED PRACTICAL NURSE
Payer: COMMERCIAL

## 2024-08-10 DIAGNOSIS — K80.20: Primary | ICD-10-CM

## 2024-08-10 DIAGNOSIS — R10.84: ICD-10-CM

## 2024-09-15 ENCOUNTER — HOSPITAL ENCOUNTER (EMERGENCY)
Dept: HOSPITAL 83 - ED | Age: 49
Discharge: HOME | End: 2024-09-15
Payer: COMMERCIAL

## 2024-09-15 VITALS — WEIGHT: 180 LBS | HEIGHT: 63.98 IN | BODY MASS INDEX: 30.73 KG/M2

## 2024-09-15 VITALS — SYSTOLIC BLOOD PRESSURE: 144 MMHG | DIASTOLIC BLOOD PRESSURE: 87 MMHG

## 2024-09-15 DIAGNOSIS — F41.9: ICD-10-CM

## 2024-09-15 DIAGNOSIS — R11.0: ICD-10-CM

## 2024-09-15 DIAGNOSIS — Z86.16: ICD-10-CM

## 2024-09-15 DIAGNOSIS — E83.41: ICD-10-CM

## 2024-09-15 DIAGNOSIS — K21.9: ICD-10-CM

## 2024-09-15 DIAGNOSIS — Z98.890: ICD-10-CM

## 2024-09-15 DIAGNOSIS — M62.08: Primary | ICD-10-CM

## 2024-09-15 DIAGNOSIS — F32.A: ICD-10-CM

## 2024-09-15 DIAGNOSIS — Z90.89: ICD-10-CM

## 2025-01-15 ENCOUNTER — HOSPITAL ENCOUNTER (EMERGENCY)
Dept: HOSPITAL 83 - ED | Age: 50
Discharge: HOME | End: 2025-01-15
Payer: COMMERCIAL

## 2025-01-15 VITALS — SYSTOLIC BLOOD PRESSURE: 122 MMHG | DIASTOLIC BLOOD PRESSURE: 64 MMHG

## 2025-01-15 VITALS — BODY MASS INDEX: 30.73 KG/M2 | HEIGHT: 63.98 IN | WEIGHT: 180 LBS

## 2025-01-15 DIAGNOSIS — E83.41: ICD-10-CM

## 2025-01-15 DIAGNOSIS — Z98.51: ICD-10-CM

## 2025-01-15 DIAGNOSIS — F32.A: ICD-10-CM

## 2025-01-15 DIAGNOSIS — Z98.890: ICD-10-CM

## 2025-01-15 DIAGNOSIS — Z90.89: ICD-10-CM

## 2025-01-15 DIAGNOSIS — M79.89: Primary | ICD-10-CM

## 2025-01-15 DIAGNOSIS — F41.9: ICD-10-CM

## 2025-05-08 ENCOUNTER — HOSPITAL ENCOUNTER (EMERGENCY)
Dept: HOSPITAL 83 - ED | Age: 50
Discharge: HOME | End: 2025-05-08
Payer: COMMERCIAL

## 2025-05-08 VITALS — DIASTOLIC BLOOD PRESSURE: 84 MMHG

## 2025-05-08 VITALS — HEIGHT: 55 IN

## 2025-05-08 DIAGNOSIS — Z98.890: ICD-10-CM

## 2025-05-08 DIAGNOSIS — G43.109: Primary | ICD-10-CM

## 2025-05-08 DIAGNOSIS — Z90.89: ICD-10-CM

## 2025-05-08 DIAGNOSIS — Z79.899: ICD-10-CM

## 2025-06-10 ENCOUNTER — HOSPITAL ENCOUNTER (EMERGENCY)
Age: 50
Discharge: HOME | End: 2025-06-10
Attending: STUDENT IN AN ORGANIZED HEALTH CARE EDUCATION/TRAINING PROGRAM
Payer: COMMERCIAL

## 2025-06-10 ENCOUNTER — APPOINTMENT (OUTPATIENT)
Dept: CT IMAGING | Age: 50
End: 2025-06-10
Payer: COMMERCIAL

## 2025-06-10 VITALS
HEART RATE: 80 BPM | TEMPERATURE: 98 F | OXYGEN SATURATION: 99 % | DIASTOLIC BLOOD PRESSURE: 80 MMHG | HEIGHT: 64 IN | BODY MASS INDEX: 30.22 KG/M2 | WEIGHT: 177 LBS | SYSTOLIC BLOOD PRESSURE: 126 MMHG | RESPIRATION RATE: 14 BRPM

## 2025-06-10 DIAGNOSIS — K80.20 CALCULUS OF GALLBLADDER WITHOUT CHOLECYSTITIS WITHOUT OBSTRUCTION: ICD-10-CM

## 2025-06-10 DIAGNOSIS — D25.9 UTERINE LEIOMYOMA, UNSPECIFIED LOCATION: ICD-10-CM

## 2025-06-10 DIAGNOSIS — N30.00 ACUTE CYSTITIS WITHOUT HEMATURIA: Primary | ICD-10-CM

## 2025-06-10 LAB
ALBUMIN SERPL-MCNC: 4 G/DL (ref 3.5–5.2)
ALP SERPL-CCNC: 87 U/L (ref 35–104)
ALT SERPL-CCNC: 8 U/L (ref 0–32)
ANION GAP SERPL CALCULATED.3IONS-SCNC: 13 MMOL/L (ref 7–16)
AST SERPL-CCNC: 16 U/L (ref 0–31)
BASOPHILS # BLD: 0.03 K/UL (ref 0–0.2)
BASOPHILS NFR BLD: 0 % (ref 0–2)
BILIRUB SERPL-MCNC: 0.3 MG/DL (ref 0–1.2)
BILIRUB UR QL STRIP: NEGATIVE
BUN SERPL-MCNC: 11 MG/DL (ref 6–20)
CALCIUM SERPL-MCNC: 8.9 MG/DL (ref 8.6–10.2)
CHLORIDE SERPL-SCNC: 103 MMOL/L (ref 98–107)
CLARITY UR: ABNORMAL
CO2 SERPL-SCNC: 24 MMOL/L (ref 22–29)
COLOR UR: ABNORMAL
CREAT SERPL-MCNC: 0.8 MG/DL (ref 0.5–1)
EOSINOPHIL # BLD: 0.14 K/UL (ref 0.05–0.5)
EOSINOPHILS RELATIVE PERCENT: 1 % (ref 0–6)
ERYTHROCYTE [DISTWIDTH] IN BLOOD BY AUTOMATED COUNT: 13.9 % (ref 11.5–15)
GFR, ESTIMATED: >90 ML/MIN/1.73M2
GLUCOSE SERPL-MCNC: 121 MG/DL (ref 74–99)
GLUCOSE UR STRIP-MCNC: NEGATIVE MG/DL
HCG UR QL: NEGATIVE
HCT VFR BLD AUTO: 40 % (ref 34–48)
HGB BLD-MCNC: 12.8 G/DL (ref 11.5–15.5)
HGB UR QL STRIP.AUTO: ABNORMAL
IMM GRANULOCYTES # BLD AUTO: 0.06 K/UL (ref 0–0.58)
IMM GRANULOCYTES NFR BLD: 1 % (ref 0–5)
KETONES UR STRIP-MCNC: NEGATIVE MG/DL
LACTATE BLDV-SCNC: 1.2 MMOL/L (ref 0.5–2.2)
LEUKOCYTE ESTERASE UR QL STRIP: ABNORMAL
LIPASE SERPL-CCNC: 55 U/L (ref 13–60)
LYMPHOCYTES NFR BLD: 1.59 K/UL (ref 1.5–4)
LYMPHOCYTES RELATIVE PERCENT: 14 % (ref 20–42)
MAGNESIUM SERPL-MCNC: 2 MG/DL (ref 1.6–2.6)
MCH RBC QN AUTO: 29.4 PG (ref 26–35)
MCHC RBC AUTO-ENTMCNC: 32 G/DL (ref 32–34.5)
MCV RBC AUTO: 92 FL (ref 80–99.9)
MONOCYTES NFR BLD: 0.71 K/UL (ref 0.1–0.95)
MONOCYTES NFR BLD: 6 % (ref 2–12)
NEUTROPHILS NFR BLD: 78 % (ref 43–80)
NEUTS SEG NFR BLD: 8.99 K/UL (ref 1.8–7.3)
NITRITE UR QL STRIP: NEGATIVE
PH UR STRIP: 6.5 [PH] (ref 5–8)
PLATELET # BLD AUTO: 278 K/UL (ref 130–450)
PMV BLD AUTO: 11.9 FL (ref 7–12)
POTASSIUM SERPL-SCNC: 4.1 MMOL/L (ref 3.5–5)
PROT SERPL-MCNC: 6.5 G/DL (ref 6.4–8.3)
PROT UR STRIP-MCNC: >=300 MG/DL
RBC # BLD AUTO: 4.35 M/UL (ref 3.5–5.5)
RBC #/AREA URNS HPF: ABNORMAL /HPF
SODIUM SERPL-SCNC: 140 MMOL/L (ref 132–146)
SP GR UR STRIP: 1.02 (ref 1–1.03)
TROPONIN I SERPL HS-MCNC: <6 NG/L (ref 0–14)
UROBILINOGEN UR STRIP-ACNC: 0.2 EU/DL (ref 0–1)
WBC #/AREA URNS HPF: ABNORMAL /HPF
WBC OTHER # BLD: 11.5 K/UL (ref 4.5–11.5)

## 2025-06-10 PROCEDURE — 84703 CHORIONIC GONADOTROPIN ASSAY: CPT

## 2025-06-10 PROCEDURE — 74177 CT ABD & PELVIS W/CONTRAST: CPT

## 2025-06-10 PROCEDURE — 2580000003 HC RX 258

## 2025-06-10 PROCEDURE — 85025 COMPLETE CBC W/AUTO DIFF WBC: CPT

## 2025-06-10 PROCEDURE — 81001 URINALYSIS AUTO W/SCOPE: CPT

## 2025-06-10 PROCEDURE — 6370000000 HC RX 637 (ALT 250 FOR IP)

## 2025-06-10 PROCEDURE — 96374 THER/PROPH/DIAG INJ IV PUSH: CPT

## 2025-06-10 PROCEDURE — 99285 EMERGENCY DEPT VISIT HI MDM: CPT

## 2025-06-10 PROCEDURE — 87086 URINE CULTURE/COLONY COUNT: CPT

## 2025-06-10 PROCEDURE — 2500000003 HC RX 250 WO HCPCS

## 2025-06-10 PROCEDURE — 83735 ASSAY OF MAGNESIUM: CPT

## 2025-06-10 PROCEDURE — 96375 TX/PRO/DX INJ NEW DRUG ADDON: CPT

## 2025-06-10 PROCEDURE — 83690 ASSAY OF LIPASE: CPT

## 2025-06-10 PROCEDURE — 6360000004 HC RX CONTRAST MEDICATION: Performed by: RADIOLOGY

## 2025-06-10 PROCEDURE — 6360000002 HC RX W HCPCS

## 2025-06-10 PROCEDURE — 96361 HYDRATE IV INFUSION ADD-ON: CPT

## 2025-06-10 PROCEDURE — 84484 ASSAY OF TROPONIN QUANT: CPT

## 2025-06-10 PROCEDURE — 83605 ASSAY OF LACTIC ACID: CPT

## 2025-06-10 PROCEDURE — 93005 ELECTROCARDIOGRAM TRACING: CPT

## 2025-06-10 PROCEDURE — 80053 COMPREHEN METABOLIC PANEL: CPT

## 2025-06-10 RX ORDER — IOPAMIDOL 755 MG/ML
75 INJECTION, SOLUTION INTRAVASCULAR
Status: COMPLETED | OUTPATIENT
Start: 2025-06-10 | End: 2025-06-10

## 2025-06-10 RX ORDER — PANTOPRAZOLE SODIUM 40 MG/10ML
40 INJECTION, POWDER, LYOPHILIZED, FOR SOLUTION INTRAVENOUS ONCE
Status: COMPLETED | OUTPATIENT
Start: 2025-06-10 | End: 2025-06-10

## 2025-06-10 RX ORDER — DOCUSATE SODIUM 100 MG/1
100 CAPSULE, LIQUID FILLED ORAL 2 TIMES DAILY
Qty: 60 CAPSULE | Refills: 0 | Status: SHIPPED | OUTPATIENT
Start: 2025-06-10 | End: 2025-06-10

## 2025-06-10 RX ORDER — CEFDINIR 300 MG/1
300 CAPSULE ORAL 2 TIMES DAILY
Qty: 14 CAPSULE | Refills: 0 | Status: SHIPPED | OUTPATIENT
Start: 2025-06-10 | End: 2025-06-10

## 2025-06-10 RX ORDER — 0.9 % SODIUM CHLORIDE 0.9 %
1000 INTRAVENOUS SOLUTION INTRAVENOUS ONCE
Status: COMPLETED | OUTPATIENT
Start: 2025-06-10 | End: 2025-06-10

## 2025-06-10 RX ORDER — DOCUSATE SODIUM 100 MG/1
100 CAPSULE, LIQUID FILLED ORAL 2 TIMES DAILY
Qty: 60 CAPSULE | Refills: 0 | Status: SHIPPED | OUTPATIENT
Start: 2025-06-10 | End: 2025-07-10

## 2025-06-10 RX ORDER — CEFDINIR 300 MG/1
300 CAPSULE ORAL 2 TIMES DAILY
Qty: 14 CAPSULE | Refills: 0 | Status: SHIPPED | OUTPATIENT
Start: 2025-06-10 | End: 2025-06-17

## 2025-06-10 RX ADMIN — PANTOPRAZOLE SODIUM 40 MG: 40 INJECTION, POWDER, FOR SOLUTION INTRAVENOUS at 15:37

## 2025-06-10 RX ADMIN — WATER 1000 MG: 1 INJECTION INTRAMUSCULAR; INTRAVENOUS; SUBCUTANEOUS at 17:42

## 2025-06-10 RX ADMIN — LIDOCAINE HYDROCHLORIDE: 20 SOLUTION ORAL at 15:37

## 2025-06-10 RX ADMIN — IOPAMIDOL 75 ML: 755 INJECTION, SOLUTION INTRAVENOUS at 17:25

## 2025-06-10 RX ADMIN — SODIUM CHLORIDE 1000 ML: 0.9 INJECTION, SOLUTION INTRAVENOUS at 15:40

## 2025-06-10 ASSESSMENT — PAIN SCALES - GENERAL: PAINLEVEL_OUTOF10: 8

## 2025-06-10 ASSESSMENT — PAIN - FUNCTIONAL ASSESSMENT: PAIN_FUNCTIONAL_ASSESSMENT: 0-10

## 2025-06-10 ASSESSMENT — PAIN DESCRIPTION - LOCATION: LOCATION: ABDOMEN

## 2025-06-10 NOTE — DISCHARGE INSTRUCTIONS
FINDINGS:  Lower Chest: Visualized lungs are normal.     Organs: Cholelithiasis in otherwise normal-appearing gallbladder.  The liver,  spleen, kidneys, adrenal glands, pancreas are normal.     GI/Bowel: Colonic fecal retention.  Normal small bowel and appendix.     Pelvis: Normal urinary bladder.  1.0 cm anterior mid uterine probable is  fibroid.     Peritoneum/Retroperitoneum: No free fluid or free air.     Bones/Soft Tissues: Unremarkable.     IMPRESSION:  Diffuse colonic fecal retention with significant stool burden.     Cholelithiasis in otherwise normal-appearing gallbladder.

## 2025-06-10 NOTE — ED NOTES
Radiology Procedure Waiver   Name: Ilsa Lilly  : 1975  MRN: [id number]    Date:  6/10/25    Time: 5:19 PM EDT    Benefits of immediately proceeding with Radiology exam(s) without pre-testing outweigh the risks or are not indicated as specified below and therefore the following is/are being waived:    [] Pregnancy test   [] Patients LMP on-time and regular.   [] Patient had Tubal Ligation or has other Contraception Device.   [] Patient  is Menopausal or Premenarcheal.    [] Patient had Full or Partial Hysterectomy.    [] Protocol for Iodine allergy    [] MRI Questionnaire     [x] BUN/Creatinine   [] Patient age w/no hx of renal dysfunction.   [] Patient on Dialysis.   [] Recent Normal Labs.  Electronically signed by Dmitriy Mi MD on 6/10/25 at 5:19 PM EDT

## 2025-06-10 NOTE — ED PROVIDER NOTES
completed with a voice recognition program.  Efforts were made to edit the dictations but occasionally words are mis-transcribed.)    Dmitriy Mi MD (electronically signed)

## 2025-06-11 LAB
EKG ATRIAL RATE: 82 BPM
EKG P AXIS: 46 DEGREES
EKG P-R INTERVAL: 158 MS
EKG Q-T INTERVAL: 364 MS
EKG QRS DURATION: 66 MS
EKG QTC CALCULATION (BAZETT): 425 MS
EKG R AXIS: 40 DEGREES
EKG T AXIS: 48 DEGREES
EKG VENTRICULAR RATE: 82 BPM
MICROORGANISM SPEC CULT: ABNORMAL
SERVICE CMNT-IMP: ABNORMAL
SPECIMEN DESCRIPTION: ABNORMAL

## 2025-06-11 PROCEDURE — 93010 ELECTROCARDIOGRAM REPORT: CPT | Performed by: INTERNAL MEDICINE

## 2025-07-19 ENCOUNTER — HOSPITAL ENCOUNTER (EMERGENCY)
Dept: HOSPITAL 83 - ED | Age: 50
Discharge: HOME | End: 2025-07-19
Payer: COMMERCIAL

## 2025-07-19 VITALS — DIASTOLIC BLOOD PRESSURE: 88 MMHG | SYSTOLIC BLOOD PRESSURE: 132 MMHG

## 2025-07-19 VITALS — HEIGHT: 63.98 IN | WEIGHT: 170 LBS | BODY MASS INDEX: 29.02 KG/M2

## 2025-07-19 DIAGNOSIS — R30.0: ICD-10-CM

## 2025-07-19 DIAGNOSIS — Z90.89: ICD-10-CM

## 2025-07-19 DIAGNOSIS — Z98.890: ICD-10-CM

## 2025-07-19 DIAGNOSIS — H92.02: ICD-10-CM

## 2025-07-19 DIAGNOSIS — Z79.899: ICD-10-CM

## 2025-07-19 DIAGNOSIS — U07.1: Primary | ICD-10-CM

## 2025-07-19 LAB
APPEARANCE UR: CLEAR
BACTERIA #/AREA URNS HPF: (no result) /[HPF]
BILIRUB UR QL STRIP: NEGATIVE
CASTS URNS QL MICRO: (no result)
COLOR UR: YELLOW
CRYSTALS URNS MICRO: (no result)
GLUCOSE UR QL: NEGATIVE
HGB UR QL STRIP: NEGATIVE
KETONES UR QL STRIP: (no result)
LEUKOCYTE ESTERASE UR QL STRIP: NEGATIVE
MUCOUS THREADS URNS QL MICRO: (no result)
NITRITE UR QL STRIP: NEGATIVE
PH UR STRIP: 7.5 [PH] (ref 4.5–8)
RBC #/AREA URNS HPF: (no result) RBC/HPF (ref 0–2)
SP GR UR: 1.02 (ref 1–1.03)
T VAGINALIS URNS QL MICRO: (no result)
UROBILINOGEN UR STRIP-MCNC: 1 E.U./DL (ref 0–1)
YEAST #/AREA URNS HPF: (no result) /[HPF]